# Patient Record
Sex: MALE | URBAN - METROPOLITAN AREA
[De-identification: names, ages, dates, MRNs, and addresses within clinical notes are randomized per-mention and may not be internally consistent; named-entity substitution may affect disease eponyms.]

---

## 2022-10-06 ENCOUNTER — APPOINTMENT (OUTPATIENT)
Dept: RADIOLOGY | Facility: MEDICAL CENTER | Age: 47
DRG: 247 | End: 2022-10-06
Attending: EMERGENCY MEDICINE
Payer: COMMERCIAL

## 2022-10-06 ENCOUNTER — APPOINTMENT (OUTPATIENT)
Dept: CARDIOLOGY | Facility: MEDICAL CENTER | Age: 47
DRG: 247 | End: 2022-10-06
Attending: EMERGENCY MEDICINE
Payer: COMMERCIAL

## 2022-10-06 ENCOUNTER — APPOINTMENT (OUTPATIENT)
Dept: CARDIOLOGY | Facility: MEDICAL CENTER | Age: 47
DRG: 247 | End: 2022-10-06
Attending: STUDENT IN AN ORGANIZED HEALTH CARE EDUCATION/TRAINING PROGRAM
Payer: COMMERCIAL

## 2022-10-06 ENCOUNTER — HOSPITAL ENCOUNTER (INPATIENT)
Facility: MEDICAL CENTER | Age: 47
LOS: 2 days | DRG: 247 | End: 2022-10-08
Attending: STUDENT IN AN ORGANIZED HEALTH CARE EDUCATION/TRAINING PROGRAM | Admitting: STUDENT IN AN ORGANIZED HEALTH CARE EDUCATION/TRAINING PROGRAM
Payer: COMMERCIAL

## 2022-10-06 DIAGNOSIS — I21.3 ST ELEVATION MYOCARDIAL INFARCTION (STEMI), UNSPECIFIED ARTERY (HCC): ICD-10-CM

## 2022-10-06 DIAGNOSIS — I21.11 ST ELEVATION MYOCARDIAL INFARCTION INVOLVING RIGHT CORONARY ARTERY (HCC): ICD-10-CM

## 2022-10-06 DIAGNOSIS — I44.2 THIRD DEGREE HEART BLOCK (HCC): ICD-10-CM

## 2022-10-06 PROBLEM — E87.29 HIGH ANION GAP METABOLIC ACIDOSIS: Status: ACTIVE | Noted: 2022-10-06

## 2022-10-06 PROBLEM — N17.9 ACUTE KIDNEY INJURY (HCC): Status: ACTIVE | Noted: 2022-10-06

## 2022-10-06 PROBLEM — D72.829 LEUKOCYTOSIS: Status: ACTIVE | Noted: 2022-10-06

## 2022-10-06 PROBLEM — R73.9 HYPERGLYCEMIA: Status: ACTIVE | Noted: 2022-10-06

## 2022-10-06 LAB
ACT BLD: 191 SEC (ref 74–137)
ACT BLD: 237 SEC (ref 74–137)
ALBUMIN SERPL BCP-MCNC: 3.8 G/DL (ref 3.2–4.9)
ALBUMIN/GLOB SERPL: 1.2 G/DL
ALP SERPL-CCNC: 77 U/L (ref 30–99)
ALT SERPL-CCNC: 36 U/L (ref 2–50)
ANION GAP SERPL CALC-SCNC: 19 MMOL/L (ref 7–16)
APTT PPP: 24.3 SEC (ref 24.7–36)
AST SERPL-CCNC: 26 U/L (ref 12–45)
BASOPHILS # BLD AUTO: 0.5 % (ref 0–1.8)
BASOPHILS # BLD: 0.07 K/UL (ref 0–0.12)
BILIRUB SERPL-MCNC: 0.3 MG/DL (ref 0.1–1.5)
BUN SERPL-MCNC: 21 MG/DL (ref 8–22)
CALCIUM SERPL-MCNC: 9.4 MG/DL (ref 8.5–10.5)
CHLORIDE SERPL-SCNC: 110 MMOL/L (ref 96–112)
CO2 SERPL-SCNC: 14 MMOL/L (ref 20–33)
CREAT SERPL-MCNC: 1.83 MG/DL (ref 0.5–1.4)
EKG IMPRESSION: NORMAL
EOSINOPHIL # BLD AUTO: 0.04 K/UL (ref 0–0.51)
EOSINOPHIL NFR BLD: 0.3 % (ref 0–6.9)
ERYTHROCYTE [DISTWIDTH] IN BLOOD BY AUTOMATED COUNT: 41.6 FL (ref 35.9–50)
GFR SERPLBLD CREATININE-BSD FMLA CKD-EPI: 45 ML/MIN/1.73 M 2
GLOBULIN SER CALC-MCNC: 3.2 G/DL (ref 1.9–3.5)
GLUCOSE SERPL-MCNC: 138 MG/DL (ref 65–99)
HCT VFR BLD AUTO: 46.4 % (ref 42–52)
HGB BLD-MCNC: 16.1 G/DL (ref 14–18)
IMM GRANULOCYTES # BLD AUTO: 0.1 K/UL (ref 0–0.11)
IMM GRANULOCYTES NFR BLD AUTO: 0.7 % (ref 0–0.9)
INR PPP: 1.13 (ref 0.87–1.13)
LIPASE SERPL-CCNC: 31 U/L (ref 11–82)
LYMPHOCYTES # BLD AUTO: 2.53 K/UL (ref 1–4.8)
LYMPHOCYTES NFR BLD: 16.9 % (ref 22–41)
MCH RBC QN AUTO: 30.3 PG (ref 27–33)
MCHC RBC AUTO-ENTMCNC: 34.7 G/DL (ref 33.7–35.3)
MCV RBC AUTO: 87.2 FL (ref 81.4–97.8)
MONOCYTES # BLD AUTO: 1.18 K/UL (ref 0–0.85)
MONOCYTES NFR BLD AUTO: 7.9 % (ref 0–13.4)
NEUTROPHILS # BLD AUTO: 11.06 K/UL (ref 1.82–7.42)
NEUTROPHILS NFR BLD: 73.7 % (ref 44–72)
NRBC # BLD AUTO: 0 K/UL
NRBC BLD-RTO: 0 /100 WBC
NT-PROBNP SERPL IA-MCNC: 51 PG/ML (ref 0–125)
PLATELET # BLD AUTO: 331 K/UL (ref 164–446)
PMV BLD AUTO: 9.8 FL (ref 9–12.9)
POTASSIUM SERPL-SCNC: 3.5 MMOL/L (ref 3.6–5.5)
PROT SERPL-MCNC: 7 G/DL (ref 6–8.2)
PROTHROMBIN TIME: 14.3 SEC (ref 12–14.6)
RBC # BLD AUTO: 5.32 M/UL (ref 4.7–6.1)
SODIUM SERPL-SCNC: 143 MMOL/L (ref 135–145)
TROPONIN T SERPL-MCNC: 25 NG/L (ref 6–19)
WBC # BLD AUTO: 15 K/UL (ref 4.8–10.8)

## 2022-10-06 PROCEDURE — B2111ZZ FLUOROSCOPY OF MULTIPLE CORONARY ARTERIES USING LOW OSMOLAR CONTRAST: ICD-10-PCS | Performed by: INTERNAL MEDICINE

## 2022-10-06 PROCEDURE — 99152 MOD SED SAME PHYS/QHP 5/>YRS: CPT | Performed by: INTERNAL MEDICINE

## 2022-10-06 PROCEDURE — 700102 HCHG RX REV CODE 250 W/ 637 OVERRIDE(OP): Performed by: INTERNAL MEDICINE

## 2022-10-06 PROCEDURE — 84484 ASSAY OF TROPONIN QUANT: CPT

## 2022-10-06 PROCEDURE — 700111 HCHG RX REV CODE 636 W/ 250 OVERRIDE (IP)

## 2022-10-06 PROCEDURE — 93005 ELECTROCARDIOGRAM TRACING: CPT | Performed by: EMERGENCY MEDICINE

## 2022-10-06 PROCEDURE — 80053 COMPREHEN METABOLIC PANEL: CPT

## 2022-10-06 PROCEDURE — 92941 PRQ TRLML REVSC TOT OCCL AMI: CPT | Mod: RC | Performed by: INTERNAL MEDICINE

## 2022-10-06 PROCEDURE — 36415 COLL VENOUS BLD VENIPUNCTURE: CPT

## 2022-10-06 PROCEDURE — 99285 EMERGENCY DEPT VISIT HI MDM: CPT

## 2022-10-06 PROCEDURE — 700105 HCHG RX REV CODE 258: Performed by: EMERGENCY MEDICINE

## 2022-10-06 PROCEDURE — B240ZZ3 ULTRASONOGRAPHY OF SINGLE CORONARY ARTERY, INTRAVASCULAR: ICD-10-PCS | Performed by: INTERNAL MEDICINE

## 2022-10-06 PROCEDURE — 83880 ASSAY OF NATRIURETIC PEPTIDE: CPT

## 2022-10-06 PROCEDURE — 4A023N7 MEASUREMENT OF CARDIAC SAMPLING AND PRESSURE, LEFT HEART, PERCUTANEOUS APPROACH: ICD-10-PCS | Performed by: INTERNAL MEDICINE

## 2022-10-06 PROCEDURE — 85347 COAGULATION TIME ACTIVATED: CPT | Mod: 91

## 2022-10-06 PROCEDURE — 700101 HCHG RX REV CODE 250

## 2022-10-06 PROCEDURE — 99291 CRITICAL CARE FIRST HOUR: CPT | Mod: GC | Performed by: INTERNAL MEDICINE

## 2022-10-06 PROCEDURE — 85025 COMPLETE CBC W/AUTO DIFF WBC: CPT

## 2022-10-06 PROCEDURE — 770022 HCHG ROOM/CARE - ICU (200)

## 2022-10-06 PROCEDURE — 71045 X-RAY EXAM CHEST 1 VIEW: CPT

## 2022-10-06 PROCEDURE — B2151ZZ FLUOROSCOPY OF LEFT HEART USING LOW OSMOLAR CONTRAST: ICD-10-PCS | Performed by: INTERNAL MEDICINE

## 2022-10-06 PROCEDURE — C1769 GUIDE WIRE: HCPCS

## 2022-10-06 PROCEDURE — 027035Z DILATION OF CORONARY ARTERY, ONE ARTERY WITH TWO DRUG-ELUTING INTRALUMINAL DEVICES, PERCUTANEOUS APPROACH: ICD-10-PCS | Performed by: INTERNAL MEDICINE

## 2022-10-06 PROCEDURE — 99222 1ST HOSP IP/OBS MODERATE 55: CPT | Performed by: STUDENT IN AN ORGANIZED HEALTH CARE EDUCATION/TRAINING PROGRAM

## 2022-10-06 PROCEDURE — 93458 L HRT ARTERY/VENTRICLE ANGIO: CPT | Mod: 26,59 | Performed by: INTERNAL MEDICINE

## 2022-10-06 PROCEDURE — A9270 NON-COVERED ITEM OR SERVICE: HCPCS

## 2022-10-06 PROCEDURE — A9270 NON-COVERED ITEM OR SERVICE: HCPCS | Performed by: INTERNAL MEDICINE

## 2022-10-06 PROCEDURE — 83690 ASSAY OF LIPASE: CPT

## 2022-10-06 PROCEDURE — 700102 HCHG RX REV CODE 250 W/ 637 OVERRIDE(OP)

## 2022-10-06 PROCEDURE — 85730 THROMBOPLASTIN TIME PARTIAL: CPT

## 2022-10-06 PROCEDURE — 02C03ZZ EXTIRPATION OF MATTER FROM CORONARY ARTERY, ONE ARTERY, PERCUTANEOUS APPROACH: ICD-10-PCS | Performed by: INTERNAL MEDICINE

## 2022-10-06 PROCEDURE — 700117 HCHG RX CONTRAST REV CODE 255: Performed by: INTERNAL MEDICINE

## 2022-10-06 PROCEDURE — 92978 ENDOLUMINL IVUS OCT C 1ST: CPT | Mod: 26,RC | Performed by: INTERNAL MEDICINE

## 2022-10-06 PROCEDURE — 85610 PROTHROMBIN TIME: CPT

## 2022-10-06 PROCEDURE — 93005 ELECTROCARDIOGRAM TRACING: CPT | Performed by: INTERNAL MEDICINE

## 2022-10-06 RX ORDER — ONDANSETRON 2 MG/ML
4 INJECTION INTRAMUSCULAR; INTRAVENOUS EVERY 4 HOURS PRN
Status: DISCONTINUED | OUTPATIENT
Start: 2022-10-06 | End: 2022-10-08 | Stop reason: HOSPADM

## 2022-10-06 RX ORDER — MIDAZOLAM HYDROCHLORIDE 1 MG/ML
INJECTION INTRAMUSCULAR; INTRAVENOUS
Status: COMPLETED
Start: 2022-10-06 | End: 2022-10-06

## 2022-10-06 RX ORDER — EPTIFIBATIDE 2 MG/ML
INJECTION, SOLUTION INTRAVENOUS
Status: COMPLETED
Start: 2022-10-06 | End: 2022-10-06

## 2022-10-06 RX ORDER — HEPARIN SODIUM 1000 [USP'U]/ML
INJECTION, SOLUTION INTRAVENOUS; SUBCUTANEOUS
Status: COMPLETED
Start: 2022-10-06 | End: 2022-10-06

## 2022-10-06 RX ORDER — PHENYLEPHRINE HCL IN 0.9% NACL 0.5 MG/5ML
SYRINGE (ML) INTRAVENOUS
Status: COMPLETED
Start: 2022-10-06 | End: 2022-10-06

## 2022-10-06 RX ORDER — LISINOPRIL 5 MG/1
5 TABLET ORAL
Status: DISCONTINUED | OUTPATIENT
Start: 2022-10-07 | End: 2022-10-08 | Stop reason: HOSPADM

## 2022-10-06 RX ORDER — PRASUGREL 10 MG/1
10 TABLET, FILM COATED ORAL DAILY
Status: DISCONTINUED | OUTPATIENT
Start: 2022-10-07 | End: 2022-10-08 | Stop reason: HOSPADM

## 2022-10-06 RX ORDER — BISACODYL 10 MG
10 SUPPOSITORY, RECTAL RECTAL
Status: DISCONTINUED | OUTPATIENT
Start: 2022-10-06 | End: 2022-10-08 | Stop reason: HOSPADM

## 2022-10-06 RX ORDER — SODIUM CHLORIDE 9 MG/ML
INJECTION, SOLUTION INTRAVENOUS CONTINUOUS
Status: DISCONTINUED | OUTPATIENT
Start: 2022-10-06 | End: 2022-10-07

## 2022-10-06 RX ORDER — ONDANSETRON 4 MG/1
4 TABLET, ORALLY DISINTEGRATING ORAL EVERY 4 HOURS PRN
Status: DISCONTINUED | OUTPATIENT
Start: 2022-10-06 | End: 2022-10-08 | Stop reason: HOSPADM

## 2022-10-06 RX ORDER — PROMETHAZINE HYDROCHLORIDE 25 MG/1
12.5-25 TABLET ORAL EVERY 4 HOURS PRN
Status: DISCONTINUED | OUTPATIENT
Start: 2022-10-06 | End: 2022-10-08 | Stop reason: HOSPADM

## 2022-10-06 RX ORDER — NOREPINEPHRINE BITARTRATE 1 MG/ML
INJECTION, SOLUTION INTRAVENOUS
Status: COMPLETED
Start: 2022-10-06 | End: 2022-10-06

## 2022-10-06 RX ORDER — PROMETHAZINE HYDROCHLORIDE 12.5 MG/1
12.5-25 SUPPOSITORY RECTAL EVERY 4 HOURS PRN
Status: DISCONTINUED | OUTPATIENT
Start: 2022-10-06 | End: 2022-10-08 | Stop reason: HOSPADM

## 2022-10-06 RX ORDER — VERAPAMIL HYDROCHLORIDE 2.5 MG/ML
INJECTION, SOLUTION INTRAVENOUS
Status: COMPLETED
Start: 2022-10-06 | End: 2022-10-06

## 2022-10-06 RX ORDER — ATORVASTATIN CALCIUM 40 MG/1
40 TABLET, FILM COATED ORAL EVERY EVENING
Status: DISCONTINUED | OUTPATIENT
Start: 2022-10-06 | End: 2022-10-08 | Stop reason: HOSPADM

## 2022-10-06 RX ORDER — AMOXICILLIN 250 MG
2 CAPSULE ORAL 2 TIMES DAILY
Status: DISCONTINUED | OUTPATIENT
Start: 2022-10-06 | End: 2022-10-08 | Stop reason: HOSPADM

## 2022-10-06 RX ORDER — ASPIRIN 81 MG/1
81 TABLET, CHEWABLE ORAL DAILY
Status: DISCONTINUED | OUTPATIENT
Start: 2022-10-07 | End: 2022-10-08 | Stop reason: HOSPADM

## 2022-10-06 RX ORDER — PROCHLORPERAZINE EDISYLATE 5 MG/ML
5-10 INJECTION INTRAMUSCULAR; INTRAVENOUS EVERY 4 HOURS PRN
Status: DISCONTINUED | OUTPATIENT
Start: 2022-10-06 | End: 2022-10-08 | Stop reason: HOSPADM

## 2022-10-06 RX ORDER — POLYETHYLENE GLYCOL 3350 17 G/17G
1 POWDER, FOR SOLUTION ORAL
Status: DISCONTINUED | OUTPATIENT
Start: 2022-10-06 | End: 2022-10-08 | Stop reason: HOSPADM

## 2022-10-06 RX ORDER — HEPARIN SODIUM 200 [USP'U]/100ML
INJECTION, SOLUTION INTRAVENOUS
Status: COMPLETED
Start: 2022-10-06 | End: 2022-10-06

## 2022-10-06 RX ORDER — PRASUGREL 10 MG/1
60 TABLET, FILM COATED ORAL ONCE
Status: DISCONTINUED | OUTPATIENT
Start: 2022-10-06 | End: 2022-10-06

## 2022-10-06 RX ORDER — PRASUGREL 10 MG/1
TABLET, FILM COATED ORAL
Status: COMPLETED
Start: 2022-10-06 | End: 2022-10-06

## 2022-10-06 RX ORDER — ACETAMINOPHEN 325 MG/1
650 TABLET ORAL EVERY 6 HOURS PRN
Status: DISCONTINUED | OUTPATIENT
Start: 2022-10-06 | End: 2022-10-08 | Stop reason: HOSPADM

## 2022-10-06 RX ORDER — LIDOCAINE HYDROCHLORIDE 20 MG/ML
INJECTION, SOLUTION INFILTRATION; PERINEURAL
Status: COMPLETED
Start: 2022-10-06 | End: 2022-10-06

## 2022-10-06 RX ADMIN — MIDAZOLAM HYDROCHLORIDE 2 MG: 1 INJECTION, SOLUTION INTRAMUSCULAR; INTRAVENOUS at 15:04

## 2022-10-06 RX ADMIN — FENTANYL CITRATE 100 MCG: 50 INJECTION, SOLUTION INTRAMUSCULAR; INTRAVENOUS at 15:08

## 2022-10-06 RX ADMIN — Medication 1000 MCG: at 15:14

## 2022-10-06 RX ADMIN — ATORVASTATIN CALCIUM 40 MG: 40 TABLET, FILM COATED ORAL at 17:38

## 2022-10-06 RX ADMIN — NOREPINEPHRINE BITARTRATE 8 MG: 1 INJECTION, SOLUTION, CONCENTRATE INTRAVENOUS at 15:00

## 2022-10-06 RX ADMIN — FENTANYL CITRATE 25 MCG: 50 INJECTION, SOLUTION INTRAMUSCULAR; INTRAVENOUS at 14:25

## 2022-10-06 RX ADMIN — EPTIFIBATIDE 20000 MCG: 2 INJECTION, SOLUTION INTRAVENOUS at 14:47

## 2022-10-06 RX ADMIN — NITROGLYCERIN 10 ML: 20 INJECTION INTRAVENOUS at 14:46

## 2022-10-06 RX ADMIN — EPTIFIBATIDE 20000 MCG: 2 INJECTION INTRAVENOUS at 15:01

## 2022-10-06 RX ADMIN — PRASUGREL 60 MG: 10 TABLET, FILM COATED ORAL at 15:09

## 2022-10-06 RX ADMIN — HEPARIN SODIUM: 1000 INJECTION, SOLUTION INTRAVENOUS; SUBCUTANEOUS at 14:45

## 2022-10-06 RX ADMIN — VERAPAMIL HYDROCHLORIDE 5 MG: 2.5 INJECTION, SOLUTION INTRAVENOUS at 14:45

## 2022-10-06 RX ADMIN — LIDOCAINE HYDROCHLORIDE: 20 INJECTION, SOLUTION INFILTRATION; PERINEURAL at 14:45

## 2022-10-06 RX ADMIN — FENTANYL CITRATE 50 MCG: 50 INJECTION, SOLUTION INTRAMUSCULAR; INTRAVENOUS at 15:12

## 2022-10-06 RX ADMIN — HEPARIN SODIUM 2000 UNITS: 200 INJECTION, SOLUTION INTRAVENOUS at 14:46

## 2022-10-06 RX ADMIN — HEPARIN SODIUM: 1000 INJECTION, SOLUTION INTRAVENOUS; SUBCUTANEOUS at 14:51

## 2022-10-06 RX ADMIN — IOHEXOL 102 ML: 350 INJECTION, SOLUTION INTRAVENOUS at 15:17

## 2022-10-06 RX ADMIN — SODIUM CHLORIDE: 9 INJECTION, SOLUTION INTRAVENOUS at 16:01

## 2022-10-06 ASSESSMENT — ENCOUNTER SYMPTOMS
WEAKNESS: 0
FOCAL WEAKNESS: 0
DIARRHEA: 0
COUGH: 0
VOMITING: 0
NAUSEA: 0
SHORTNESS OF BREATH: 0
PALPITATIONS: 0
FEVER: 0
BLURRED VISION: 0
CHILLS: 0
ABDOMINAL PAIN: 0
DOUBLE VISION: 0

## 2022-10-06 ASSESSMENT — HEART SCORE
HISTORY: HIGHLY SUSPICIOUS
HEART SCORE: 7
RISK FACTORS: 1-2 RISK FACTORS
TROPONIN: 1-3 TIMES NORMAL LIMIT
ECG: SIGNIFICANT ST-DEPRESSION
AGE: 45-64

## 2022-10-06 ASSESSMENT — PAIN DESCRIPTION - PAIN TYPE
TYPE: ACUTE PAIN

## 2022-10-06 ASSESSMENT — FIBROSIS 4 INDEX: FIB4 SCORE: 0.6

## 2022-10-06 NOTE — PROGRESS NOTES
2 RN Skin Assessment Completed by Nadeem RN and Tiana RN.    Head: WDL  Ears: WDL  Nose: WDL  Mouth: WDL  Neck: WDL  Breasts/Chest: WDL  Shoulder Blades: WDL  Spine: WDL  (R) Arm/Elbow/hand: WDL  (L) Arm/Elbow/hand: WDL  Abdomen:WDL  Groin: WDL  Sacrum/Coccyx/Buttocks: WDL  (R) Leg: WDL  (L) Leg: WDL  (R) Heel/Foot/Toe: WDL  (L) Heel/Foot/Toe: WDL          Devices in place: ECG, Tele Box, BP Cuff, and Pulse Ox    Interventions in place: Q2 turns and Low air loss mattress     Possible skin injury found: No    Pictures uploaded into Epic: N/A  Wound Consult Placed: N/A

## 2022-10-06 NOTE — CONSULTS
Reason for Consult:  Asked by Dr Casarez to see this patient with inferior STEMI   Patient's PCP: No primary care provider on file.    CC:] STEMI      HPI:  Klaus Rodriguez is a 47 year old man with no prior medical history (nonsmoker/non drug user) who presented via EMS for STEMI.  The patient started having chest pain about 45 mins prior to arrival after doing some physical work.     He reports 10/10 chest pain.  On arrival, the patient was bradycardic and hypotensive with HR 40s and BP 80s/40s.  He reports associated shortness of breath. No orthopnea/PND/leg swelling. No palpitations. No syncope or presyncopal episodes.     Medications / Drug list prior to admission:  No current facility-administered medications on file prior to encounter.     No current outpatient medications on file prior to encounter.       Current list of administered Medications:    Current Facility-Administered Medications:     VERAPAMIL HCL 2.5 MG/ML IV SOLN, , , ,     HEPARIN SODIUM (PORCINE) 1000 UNIT/ML INJ SOLN, , , ,     LIDOCAINE HCL 2 % INJ SOLN, , , ,     HEPARIN (PORCINE) IN NACL 2000-0.9 UNIT/L-% IV SOLN, , , ,     NITROGLYCERIN 2 MG IV SOLN, , , ,     FENTANYL CITRATE (PF) 0.05 MG/ML INJ SOLN (WRAPPED), , , ,     MIDAZOLAM HCL 2 MG/2ML INJ SOLN (WRAPPED), , , ,     ATROPINE SULFATE 0.1 MG/ML INJ SOLN, , , ,     NS infusion, , Intravenous, Continuous, Trinity Casarez M.D.    PHENYLEPHRINE HCL-NACL 1-0.9 MG/10ML-% IV SOSY, , , ,     HEPARIN SODIUM (PORCINE) 1000 UNIT/ML INJ SOLN, , , ,   No current outpatient medications on file.    No past medical history on file.    No past surgical history on file.    No family history on file.  Patient family history was personally reviewed, no pertinent family history to current presentation         ALLERGIES:  Not on File    Review of systems:  A complete review of symptoms was reviewed with patient. This is reviewed in H&P and PMH. ALL OTHERS reviewed and negative    Physical exam:  BP 89/45 HR  45  Wt 113 kg (250 lb)    General: In mild distress  EYES: no jaundice  HEENT: OP clear   Neck: No bruits No JVD.   CVS:  Bradycardic rate, regular rhythm. S1 + S2. No M/R/G  Resp: CTAB. No wheezing or crackles/rhonchi.  Abdomen: Soft, NT, ND,  Skin: Grossly nothing acute no obvious rashes  Neurological: Alert, Moves all extremities, no cranial nerve defects on limited exam  Extremities: Pulse 2+ in b/l LE.  No edema. No cyanosis.       Data:  Laboratory studies personally reviewed by me:  Recent Results (from the past 24 hour(s))   EKG    Collection Time: 10/06/22  2:17 PM   Result Value Ref Range    Report       [facility]    Test Date:  2022-10-06  Pt Name:    ТАТЬЯНА SHEPHERD               Department: ER  MRN:        [id number]                      Room:       TRAUMA - EXAM 1  Gender:     Male                         Technician: [id number]  :        1975                   Requested By:MAHNAZ PATINO  Order #:    [id number]                    Reading MD:    Measurements  Intervals                                Axis  Rate:       47                           P:          0  WY:                                      QRS:        77  QRSD:       105                          T:          95  QT:         516  QTc:        457    Interpretive Statements  AV block, complete (third degree)  Inferior infarct, acute (RCA)  Lateral leads are also involved  Probable RV involvement, suggest recording right precordial leads  No previous ECG available for comparison         Imaging:  CL-LEFT HEART CATHETERIZATION WITH POSSIBLE INTERVENTION    (Results Pending)   DX-CHEST-PORTABLE (1 VIEW)    (Results Pending)           EKG 10/6/2022: personally reviewed by me inferior STEMI, third degree AV block    All pertinent features of laboratory and imaging reviewed including primary images where applicable      Active Problems:    STEMI (ST elevation myocardial infarction) (HCC) POA: Unknown    CHB (complete  heart block) (HCC) POA: Unknown  Resolved Problems:    * No resolved hospital problems. *      Assessment / Plan:  Inferior STEMI  CHB  -Informed cath lab, plan for urgent cardiac catheterization with Dr Live   -Aspirin 325mg once, followed by Aspirin 81mg daily, P2Y12 to be started in cath lab  -Start atorvastatin 40mg daily  -Hold beta blocker due to high degree AV block  -Start start lisinopril 5mg PO daily tomorrow  -Obtain echocardiogram to assess LVEF and any structural abnormalities.     I personally discussed his case with  Dr Danna rivera. providers found    It is my pleasure to participate in the care of Mr. Rodriguez.  Please do not hesitate to contact me with questions or concerns.    Josafat Hills MD   Cardiologist [facility]    10/6/2022    Please note that this dictation was created using voice recognition software. There may be errors I did not discover before finalizing the note.

## 2022-10-06 NOTE — PROGRESS NOTES
TOA 1419     PTA Medications: 325 ASA, 150 mcg fentanyl, Nitro x 1    Bradycardic, Soft BP, 1L bolus started upon arrival.      STEMI confirmed by Dr. Hills and Dr. Live upon arrival.  Patient transferred straight from trauma bay to Cath Lab 4 [facility].

## 2022-10-06 NOTE — H&P
Critical Care/Pulmonary Consultation    Date of Service: 10/6/2022    Date of Admission:  10/6/2022  2:22 PM    Consulting Physician: Wisam Saleem D.O.    Chief Complaint:  No chief complaint on file.      History of Present Illness: Klaus Rodriguez is a 47 y.o. male with no significant past medical history.  He was taking a scaling class near [place], was about 20 feet up an incline when he started to become dizzy, disoriented and started having crushing, substernal chest pain.  He was lowered down and his team called 911, who told them to administer 325 of aspirin at that time.  EMS arrived and administered nitroglycerin with continued pain.  He was transported by air ambulance to [facility].  Upon presentation he was found to have inferior STEMI and was taken to cath lab immediately.  He had Severe occlusion of the proximal right coronary artery w/ 2 overlapping RUIZ placed in the proximal to mid right coronary artery.      After cath he is feeling great, reports no chest pain, no shortness of breath, no dizziness.  He is awake, alert and oriented x4.  Vitals stable in cath.  Labs on admission remarkable for WBC 15, CO2 14, anion gap 19, creatinine 1.83, troponin initially 25.  CXR unremarkable.  Repeat EKG following cath showing improvement of ST elevations.  Pt admitted to ICU for post cardiac catheterization care.      Review of Systems   Constitutional:  Negative for chills and fever.   Eyes:  Negative for blurred vision and double vision.   Respiratory:  Negative for cough and shortness of breath.    Cardiovascular:  Negative for chest pain and palpitations.   Gastrointestinal:  Negative for abdominal pain, diarrhea, nausea and vomiting.   Genitourinary:  Negative for dysuria and urgency.   Neurological:  Negative for focal weakness and weakness.     Home Medications    **Home medications have not yet been reviewed for this encounter**     Pt takes no medications.            No past medical history on file.    No  "past surgical history on file.    Allergies: Patient has no allergy information on record.    Family History:  Triple bypass in his father.      Social hx:  Tobacco:  approx 30 years smoking, quit 2 years ago but has transitioned to vaping without nicotine.  Alcohol:  social, approx 6 beers/month.  Denies recreational drugs.     Vitals:    10/06/22 1431 10/06/22 1533   Height:  1.778 m (5' 10\")   Weight: 113 kg (250 lb) 111 kg (244 lb 11.4 oz)   Weight % change since last entry.: 0 % -2.12 %   BP:  115/68   BMI (Calculated):  35.11       Physical Examination    Physical Exam  Vitals and nursing note reviewed.   Constitutional:       General: He is not in acute distress.     Appearance: He is not toxic-appearing.   HENT:      Head: Normocephalic and atraumatic.      Mouth/Throat:      Mouth: Mucous membranes are moist.      Pharynx: Oropharynx is clear.   Eyes:      Conjunctiva/sclera: Conjunctivae normal.   Cardiovascular:      Rate and Rhythm: Normal rate and regular rhythm.      Pulses: Normal pulses.      Heart sounds: Normal heart sounds. No murmur heard.    No friction rub. No gallop.      Comments: Difficult to auscultate due to body habitus.    Pulmonary:      Effort: Pulmonary effort is normal. No respiratory distress.      Breath sounds: Normal breath sounds. No wheezing, rhonchi or rales.   Abdominal:      General: Abdomen is flat. There is no distension.      Palpations: Abdomen is soft.      Tenderness: There is no abdominal tenderness.   Musculoskeletal:      Right lower leg: No edema.      Left lower leg: No edema.   Skin:     General: Skin is warm and dry.   Neurological:      General: No focal deficit present.      Mental Status: He is alert.      Comments: Moving all 4 extremities spontaneously.          No intake or output data in the 24 hours ending 10/06/22 1634    Recent Labs     10/06/22  1425   WBC 15.0*   NEUTSPOLYS 73.70*   LYMPHOCYTES 16.90*   MONOCYTES 7.90   EOSINOPHILS 0.30   BASOPHILS " 0.50   ASTSGOT 26   ALTSGPT 36   ALKPHOSPHAT 77   TBILIRUBIN 0.3     Recent Labs     10/06/22  1425   SODIUM 143   POTASSIUM 3.5*   CHLORIDE 110   CO2 14*   BUN 21   CREATININE 1.83*   CALCIUM 9.4     Recent Labs     10/06/22  1425   ALTSGPT 36   ASTSGOT 26   ALKPHOSPHAT 77   TBILIRUBIN 0.3   LIPASE 31   GLUCOSE 138*         CL-LEFT HEART CATHETERIZATION WITH POSSIBLE INTERVENTION    (Results Pending)   DX-CHEST-PORTABLE (1 VIEW)    (Results Pending)       Patient Active Problem List   Diagnosis    STEMI (ST elevation myocardial infarction) (HCC)    CHB (complete heart block) (HCC)    STEMI involving right coronary artery (HCC)       Assessment and Plan:    * STEMI involving right coronary artery (HCC)  Assessment & Plan  Inferior STEMI present on admission, taken to cath lab on 10/6 found to have RCA occlusion, now s/p 2 overlapping RUIZ placed to proximal mid to right RCA.  Pt reports no previous episodes of chest pain prior to this.    -cardiology following, appreciate assistance  -optimize medical management with atorvastatin, hold beta blocker in setting of complete heart block on admission, lisinopril when able, dual antiplatelet therapy  -continue monitoring for chest pain  -echocardiogram  -check a1c, lipid panel    Acute kidney injury (HCC)  Assessment & Plan  Likely pre-renal in the setting of MI.  -continue to monitor  -avoid nephrotoxins  -renally dose meds as appropriate    High anion gap metabolic acidosis  Assessment & Plan  Present on admission.  Likely due to lactic acidosis in the setting of MI.    -continue to monitor, if continues consider further investigation    CHB (complete heart block) (HCC)  Assessment & Plan  Complete heart block present on admission.  Likely due to STEMI.  Repeat EKG looks improved, although pending formal read.    -pending formal EKG read    Hyperglycemia  Assessment & Plan  Present on admission.  Possibly due to acute stress of MI vs chronic.  -a1c  pending    Leukocytosis  Assessment & Plan  Likely reactive in the setting of MI.  No obvious s/sx of infection.  -continue to monitor       Quality measures:  Feeding:  cardiac  Analgesia: tylenol  Sedation: n/a  Thromboprophylaxis: n/a  Head of bed: >30 degrees  Ulcer prophylaxis: n/a  Glycemic control: n/a  Bowel care: bowel regimen  Indwelling lines: pIV  Deescalation of antibiotics: n/a      CODE STATUS:   Full code  DISPO:    ICU admission for post STEMI      William Shaw D.O.  Internal Medicine Resident  [facility]  Patient is critically ill.   The patient continues to have: post cardiac cath  The vital organ system that is affected is the: cardiac  If untreated there is a high chance of deterioration into: treated at this time.  And eventually death.   The critical care that I am providing today is: post STEMI care  The critical that has been undertaken is medically complex.   There has been no overlap in critical care time.   Critical Care Time not including procedures: 45 minutes

## 2022-10-06 NOTE — DISCHARGE PLANNING
MSW responded to Code Stemi. Pt was BIB [facility] from [facility] job site in [place]. Pt requested MSW call his wife Priyanka Rodriguez ([phone]). MSW update Priyanka. She is on her way to [facility] from [place].

## 2022-10-06 NOTE — ED PROVIDER NOTES
"ED Provider Note    Scribed for Trinity Casarez M.D. by Isra Dee. 10/6/2022  2:19 PM    Primary care provider: No primary care provider noted.  Means of arrival: EMS  History obtained from: EMS  History limited by: None  CHIEF COMPLAINT  STEMI alert from the field      HPI  Klaus Rodriguez is a 47 y.o. male who presents to the Emergency Department via EMS as a code STEMI onset at approximately 1:35 PM today. 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 He was moved from his work site to [facility] where EMS transported him to the ED and arrived at 2:19 PM with his episode of chest pain lasting approximately 45 minutes. The patient reports that his chest pain is radiating into his back, but does not radiate into her arms or jaw. Associated symptoms include diaphoresis, lightheadedness, shortness of breath, nausea, and back pain. The patient denies any vomiting, abdominal pain, generalized numbness, generalized tingling. EMS medicated the patient with Aspirin 325 mg, Fentanyl 150 mcg, and Nitroglycerin with minimal alleviation. No exacerbating factors noted. He denies any pertinent medical history including but not limited to diabetes, hypertension,  or MI. The patient does not take any daily medications. He denies any alcohol consumption, tobacco use, or illicit drug use including marijuana, methamphetamines, or cocaine. The patient admits to occasionally vaping. There is a paternal history of MI in his 70s. He is unaware of any allergies.      REVIEW OF SYSTEMS  Pertinent positives include code STEMI, diaphoresis, lightheadedness, shortness of breath, nausea, and back pain.   Pertinent negatives include no vomiting, abdominal pain, generalized numbness, generalized tingling.   See HPI for further details. All other systems are negative.    PAST MEDICAL HISTORY  No past medical history " "noted.    FAMILY HISTORY  No family history noted.    SOCIAL HISTORY      Social History     Substance and Sexual Activity   Drug Use None noted       SURGICAL HISTORY  No past surgical history noted.    CURRENT MEDICATIONS  Home Medications    **Home medications have not yet been reviewed for this encounter**         ALLERGIES  None noted    PHYSICAL EXAM  VITAL SIGNS: /68   Ht 1.778 m (5' 10\")   Wt 111 kg (244 lb 11.4 oz)   BMI 35.11 kg/m²      Constitutional: Well developed, well nourished; Moderate to severe distress; Ill-appearing  HENT: Normocephalic, atraumatic; Bilateral external ears normal; oropharyngeal examination deferred due to COVID-19 outbreak and lack of oropharyngeal complaint  Eyes: PERRL, EOMI, Conjunctiva normal. No discharge.   Neck:  Mild JVD. Supple, nontender midline; No stridor; No nuchal rigidity.   Lymphatic: No cervical lymphadenopathy noted.   Cardiovascular: Regular rate and rhythm without murmurs, rubs, or gallop.   Thorax & Lungs: No respiratory distress, breath sounds clear to auscultation bilaterally without wheezing, rales or rhonchi. Nontender chest wall. No crepitus or subcutaneous air  Abdomen: Soft, nontender, bowel sounds normal. No obvious masses; No pulsatile masses; no rebound, guarding, or peritoneal signs.   Skin: Good color; warm and dry without rash or petechia.  Back: Nontender, No CVA tenderness.   Extremities: Distal radial, dorsalis pedis, posterior tibial pulses are equal bilaterally; No edema; Nontender calves or saphenous, No cyanosis, No clubbing.   Musculoskeletal: Good range of motion in all major joints. No tenderness to palpation or major deformities noted.   Neurologic: Alert & oriented x 4, clear speech.    EKG  12 Lead EKG interpreted by me, as noted below.     LABS/RADIOLOGY/PROCEDURES  Results for orders placed or performed during the hospital encounter of 10/06/22   TROPONIN   Result Value Ref Range    Troponin T 25 (H) 6 - 19 ng/L   proBrain " Natriuretic Peptide, NT   Result Value Ref Range    NT-proBNP 51 0 - 125 pg/mL   CBC WITH DIFFERENTIAL   Result Value Ref Range    WBC 15.0 (H) 4.8 - 10.8 K/uL    RBC 5.32 4.70 - 6.10 M/uL    Hemoglobin 16.1 14.0 - 18.0 g/dL    Hematocrit 46.4 42.0 - 52.0 %    MCV 87.2 81.4 - 97.8 fL    MCH 30.3 27.0 - 33.0 pg    MCHC 34.7 33.7 - 35.3 g/dL    RDW 41.6 35.9 - 50.0 fL    Platelet Count 331 164 - 446 K/uL    MPV 9.8 9.0 - 12.9 fL    Neutrophils-Polys 73.70 (H) 44.00 - 72.00 %    Lymphocytes 16.90 (L) 22.00 - 41.00 %    Monocytes 7.90 0.00 - 13.40 %    Eosinophils 0.30 0.00 - 6.90 %    Basophils 0.50 0.00 - 1.80 %    Immature Granulocytes 0.70 0.00 - 0.90 %    Nucleated RBC 0.00 /100 WBC    Neutrophils (Absolute) 11.06 (H) 1.82 - 7.42 K/uL    Lymphs (Absolute) 2.53 1.00 - 4.80 K/uL    Monos (Absolute) 1.18 (H) 0.00 - 0.85 K/uL    Eos (Absolute) 0.04 0.00 - 0.51 K/uL    Baso (Absolute) 0.07 0.00 - 0.12 K/uL    Immature Granulocytes (abs) 0.10 0.00 - 0.11 K/uL    NRBC (Absolute) 0.00 K/uL   COMP METABOLIC PANEL   Result Value Ref Range    Sodium 143 135 - 145 mmol/L    Potassium 3.5 (L) 3.6 - 5.5 mmol/L    Chloride 110 96 - 112 mmol/L    Co2 14 (L) 20 - 33 mmol/L    Anion Gap 19.0 (H) 7.0 - 16.0    Glucose 138 (H) 65 - 99 mg/dL    Bun 21 8 - 22 mg/dL    Creatinine 1.83 (H) 0.50 - 1.40 mg/dL    Calcium 9.4 8.5 - 10.5 mg/dL    AST(SGOT) 26 12 - 45 U/L    ALT(SGPT) 36 2 - 50 U/L    Alkaline Phosphatase 77 30 - 99 U/L    Total Bilirubin 0.3 0.1 - 1.5 mg/dL    Albumin 3.8 3.2 - 4.9 g/dL    Total Protein 7.0 6.0 - 8.2 g/dL    Globulin 3.2 1.9 - 3.5 g/dL    A-G Ratio 1.2 g/dL   LIPASE   Result Value Ref Range    Lipase 31 11 - 82 U/L   PROTHROMBIN TIME   Result Value Ref Range    PT 14.3 12.0 - 14.6 sec    INR 1.13 0.87 - 1.13   APTT   Result Value Ref Range    APTT 24.3 (L) 24.7 - 36.0 sec   ESTIMATED GFR   Result Value Ref Range    GFR (CKD-EPI) 45 (A) >60 mL/min/1.73 m 2   EKG   Result Value Ref Range    Report       [facility] Emergency Dept.    Test Date:  2022-10-06  Pt Name:    ТАТЬЯНА SHEPHERD               Department: ER  MRN:        [id number]                      Room:       [facility]  Gender:     Male                         Technician: [id number]  :        1975                   Requested By:TRINITY CASAREZ  Order #:    [id number]                    Reading MD: Trinity Casarez    Measurements  Intervals                                Axis  Rate:       47                           P:          0  OH:                                      QRS:        77  QRSD:       105                          T:          95  QT:         516  QTc:        457    Interpretive Statements  AV block, complete (third degree)  Inferior infarct, acute (RCA)  STEMI  Lateral leads are also involved  Probable RV involvement, suggest recording right precordial leads  No previous ECG available for comparison  Electronically Signed On 10-6-2022 15:34:50 PDT by Trinity Casarez     POCT activated clotting time device results   Result Value Ref Range    Istat Activated Clotting Time 191 (H) 74 - 137 sec   POCT activated clotting time device results   Result Value Ref Range    Istat Activated Clotting Time 237 (H) 74 - 137 sec       CL-LEFT HEART CATHETERIZATION WITH POSSIBLE INTERVENTION    (Results Pending)   DX-CHEST-PORTABLE (1 VIEW)    (Results Pending)       COURSE & MEDICAL DECISION MAKING  Pertinent Labs & Imaging studies reviewed. (See chart for details)    2:19 PM - Patient seen and examined in the trauma bay. Discussed plan of care, including labs, imaging, and immediate cardiac catheterization. Patient agrees to the plan of care. The patient will be resuscitated with 1L NS IV and medicated with Fentanyl 25 mcg. Ordered for labs and imaging to evaluate his symptoms. Dr. Hills was present at bedside during initial evaluation and will be taken to the catheterization lab for imminent intervention by Dr. Live.    Patient presents to the ER as a code  STEMI from the field.  He was up near the [facility], which is something he does for work on a regular basis, when he started having crushing substernal chest pain radiating to his back.  He describes lightheadedness, diaphoresis, nausea and shortness of breath.  No vomiting.  He has no medical history.  No known heart problems.  No diabetes, hypertension, hyperlipidemia or tobacco use.  He vapes on occasion.  No drug use.  Father had a heart attack in his 70s, but otherwise no significant family history.  Patient is having a STEMI with third-degree heart block.  He arrived into the trauma bay.  Dr. Hills, cardiologist, and I met the patient upon arrival.  She contacted Dr. Live, interventional list, and patient will go to Cath Lab emergently.  Blood pressure was in the mid to high 90s systolic.  Heart rate was in the high 40s to low 50s with a third-degree heart block.  Pacer pads were placed.  Patient was given IV fluids.  He received aspirin in the field.  He also received fentanyl in the field was still complaining of a 10 out of 10 chest pain.  Labs were drawn.  He was taken emergently to cardiac catheterization lab.  Based upon their findings the cardiologist will contact intensivist/hospitalist for hospitalization.    HEART SCORE: 7    CRITICAL CARE  The very real possibilty of a deterioration of this patient's condition required the highest level of my preparedness for sudden, emergent intervention.  I provided critical care services, which included medication orders, frequent reevaluations of the patient's condition and response to treatment, ordering and reviewing test results, and discussing the case with various consultants.  The critical care time associated with the care of the patient was 35 minutes. Review chart for interventions. This time is exclusive of any other billable procedures.     DISPOSITION:  Patient will be taken to cath lab under the care of Dr. Hills in critical  condition.    FINAL IMPRESSION  1. ST elevation myocardial infarction involving right coronary artery (HCC)    2. ST elevation myocardial infarction (STEMI), unspecified artery (HCC) Acute   3. Third degree heart block (HCC) Acute    Critical care time performed by me, as detailed above     Isra FERNANDEZ (Scribe), am scribing for, and in the presence of, Trinity Casarez M.D..    Electronically signed by: Isra Dee (Scribe), 10/6/2022    Trinity FERNANDEZ M.D. personally performed the services described in this documentation, as scribed by Isra Dee in my presence, and it is both accurate and complete.    This dictation has been created using voice recognition software. The accuracy of the dictation is limited by the abilities of the software. I expect there may be some errors of grammar and possibly content. I made every attempt to manually correct the errors within my dictation. However, errors related to voice recognition software may still exist and should be interpreted within the appropriate context.    The note accurately reflects work and decisions made by me.  Trinity Casarez M.D.  10/6/2022  3:38 PM

## 2022-10-07 ENCOUNTER — APPOINTMENT (OUTPATIENT)
Dept: CARDIOLOGY | Facility: MEDICAL CENTER | Age: 47
DRG: 247 | End: 2022-10-07
Attending: NURSE PRACTITIONER
Payer: COMMERCIAL

## 2022-10-07 LAB
ANION GAP SERPL CALC-SCNC: 11 MMOL/L (ref 7–16)
BASOPHILS # BLD AUTO: 0.2 % (ref 0–1.8)
BASOPHILS # BLD: 0.02 K/UL (ref 0–0.12)
BUN SERPL-MCNC: 14 MG/DL (ref 8–22)
CALCIUM SERPL-MCNC: 8.5 MG/DL (ref 8.5–10.5)
CHLORIDE SERPL-SCNC: 113 MMOL/L (ref 96–112)
CHOLEST SERPL-MCNC: 138 MG/DL (ref 100–199)
CO2 SERPL-SCNC: 18 MMOL/L (ref 20–33)
CREAT SERPL-MCNC: 0.97 MG/DL (ref 0.5–1.4)
EKG IMPRESSION: NORMAL
EOSINOPHIL # BLD AUTO: 0.03 K/UL (ref 0–0.51)
EOSINOPHIL NFR BLD: 0.3 % (ref 0–6.9)
ERYTHROCYTE [DISTWIDTH] IN BLOOD BY AUTOMATED COUNT: 45.1 FL (ref 35.9–50)
EST. AVERAGE GLUCOSE BLD GHB EST-MCNC: 108 MG/DL
GFR SERPLBLD CREATININE-BSD FMLA CKD-EPI: 97 ML/MIN/1.73 M 2
GLUCOSE SERPL-MCNC: 133 MG/DL (ref 65–99)
HBA1C MFR BLD: 5.4 % (ref 4–5.6)
HCT VFR BLD AUTO: 43.2 % (ref 42–52)
HDLC SERPL-MCNC: 40 MG/DL
HGB BLD-MCNC: 14.4 G/DL (ref 14–18)
IMM GRANULOCYTES # BLD AUTO: 0.05 K/UL (ref 0–0.11)
IMM GRANULOCYTES NFR BLD AUTO: 0.5 % (ref 0–0.9)
LDLC SERPL CALC-MCNC: 83 MG/DL
LV EJECT FRACT  99904: 53
LV EJECT FRACT MOD 2C 99903: 46.6
LV EJECT FRACT MOD 4C 99902: 52.72
LV EJECT FRACT MOD BP 99901: 53.25
LYMPHOCYTES # BLD AUTO: 1.91 K/UL (ref 1–4.8)
LYMPHOCYTES NFR BLD: 17.4 % (ref 22–41)
MAGNESIUM SERPL-MCNC: 2 MG/DL (ref 1.5–2.5)
MCH RBC QN AUTO: 29.9 PG (ref 27–33)
MCHC RBC AUTO-ENTMCNC: 33.3 G/DL (ref 33.7–35.3)
MCV RBC AUTO: 89.8 FL (ref 81.4–97.8)
MONOCYTES # BLD AUTO: 1.05 K/UL (ref 0–0.85)
MONOCYTES NFR BLD AUTO: 9.6 % (ref 0–13.4)
NEUTROPHILS # BLD AUTO: 7.93 K/UL (ref 1.82–7.42)
NEUTROPHILS NFR BLD: 72 % (ref 44–72)
NRBC # BLD AUTO: 0 K/UL
NRBC BLD-RTO: 0 /100 WBC
PLATELET # BLD AUTO: 294 K/UL (ref 164–446)
PMV BLD AUTO: 11.8 FL (ref 9–12.9)
POTASSIUM SERPL-SCNC: 3.7 MMOL/L (ref 3.6–5.5)
RBC # BLD AUTO: 4.81 M/UL (ref 4.7–6.1)
SODIUM SERPL-SCNC: 142 MMOL/L (ref 135–145)
TRIGL SERPL-MCNC: 76 MG/DL (ref 0–149)
WBC # BLD AUTO: 11 K/UL (ref 4.8–10.8)

## 2022-10-07 PROCEDURE — 80048 BASIC METABOLIC PNL TOTAL CA: CPT

## 2022-10-07 PROCEDURE — 93306 TTE W/DOPPLER COMPLETE: CPT

## 2022-10-07 PROCEDURE — A9270 NON-COVERED ITEM OR SERVICE: HCPCS | Performed by: INTERNAL MEDICINE

## 2022-10-07 PROCEDURE — 700102 HCHG RX REV CODE 250 W/ 637 OVERRIDE(OP): Performed by: INTERNAL MEDICINE

## 2022-10-07 PROCEDURE — 700102 HCHG RX REV CODE 250 W/ 637 OVERRIDE(OP): Performed by: NURSE PRACTITIONER

## 2022-10-07 PROCEDURE — 80061 LIPID PANEL: CPT

## 2022-10-07 PROCEDURE — A9270 NON-COVERED ITEM OR SERVICE: HCPCS | Performed by: NURSE PRACTITIONER

## 2022-10-07 PROCEDURE — 99232 SBSQ HOSP IP/OBS MODERATE 35: CPT | Mod: FS | Performed by: STUDENT IN AN ORGANIZED HEALTH CARE EDUCATION/TRAINING PROGRAM

## 2022-10-07 PROCEDURE — 85025 COMPLETE CBC W/AUTO DIFF WBC: CPT

## 2022-10-07 PROCEDURE — 770020 HCHG ROOM/CARE - TELE (206)

## 2022-10-07 PROCEDURE — 83735 ASSAY OF MAGNESIUM: CPT

## 2022-10-07 PROCEDURE — 93010 ELECTROCARDIOGRAM REPORT: CPT | Performed by: INTERNAL MEDICINE

## 2022-10-07 PROCEDURE — 700105 HCHG RX REV CODE 258: Performed by: EMERGENCY MEDICINE

## 2022-10-07 PROCEDURE — 83036 HEMOGLOBIN GLYCOSYLATED A1C: CPT

## 2022-10-07 PROCEDURE — 93306 TTE W/DOPPLER COMPLETE: CPT | Mod: 26 | Performed by: INTERNAL MEDICINE

## 2022-10-07 RX ADMIN — SENNOSIDES AND DOCUSATE SODIUM 2 TABLET: 50; 8.6 TABLET ORAL at 05:46

## 2022-10-07 RX ADMIN — METOPROLOL TARTRATE 12.5 MG: 25 TABLET, FILM COATED ORAL at 10:56

## 2022-10-07 RX ADMIN — PRASUGREL 10 MG: 10 TABLET, FILM COATED ORAL at 05:53

## 2022-10-07 RX ADMIN — ASPIRIN 81 MG 81 MG: 81 TABLET ORAL at 05:47

## 2022-10-07 RX ADMIN — LISINOPRIL 5 MG: 5 TABLET ORAL at 05:47

## 2022-10-07 RX ADMIN — METOPROLOL TARTRATE 12.5 MG: 25 TABLET, FILM COATED ORAL at 17:32

## 2022-10-07 RX ADMIN — ATORVASTATIN CALCIUM 40 MG: 40 TABLET, FILM COATED ORAL at 17:32

## 2022-10-07 RX ADMIN — SODIUM CHLORIDE: 9 INJECTION, SOLUTION INTRAVENOUS at 01:55

## 2022-10-07 ASSESSMENT — ENCOUNTER SYMPTOMS
SHORTNESS OF BREATH: 0
WHEEZING: 0
APNEA: 0
STRIDOR: 0
CHOKING: 0
CHEST TIGHTNESS: 0
COUGH: 0

## 2022-10-07 ASSESSMENT — FIBROSIS 4 INDEX
FIB4 SCORE: 0.68
FIB4 SCORE: 0.68

## 2022-10-07 ASSESSMENT — COGNITIVE AND FUNCTIONAL STATUS - GENERAL
SUGGESTED CMS G CODE MODIFIER MOBILITY: CI
SUGGESTED CMS G CODE MODIFIER DAILY ACTIVITY: CH
MOBILITY SCORE: 23
CLIMB 3 TO 5 STEPS WITH RAILING: A LITTLE
DAILY ACTIVITIY SCORE: 24

## 2022-10-07 ASSESSMENT — PAIN DESCRIPTION - PAIN TYPE
TYPE: ACUTE PAIN
TYPE: ACUTE PAIN

## 2022-10-07 NOTE — PROGRESS NOTES
NOC Rn had pt for approximately 45 minutes before handoff to BG Venegas. Pt had 5 beats of Vtach per monitor tech. RN informed charge. Have not heard back on next action. Pt is stable with no complaints and sitting comfortably in chair.     BG Salas

## 2022-10-07 NOTE — ASSESSMENT & PLAN NOTE
Complete heart block present on admission.  Likely due to STEMI.  Repeat EKG looks improved, although pending formal read.    -pending formal EKG read

## 2022-10-07 NOTE — ASSESSMENT & PLAN NOTE
Inferior STEMI present on admission, taken to cath lab on 10/6 found to have RCA occlusion, now s/p 2 overlapping RUIZ placed to proximal mid to right RCA.  Pt reports no previous episodes of chest pain prior to this.    -cardiology following, appreciate assistance  -optimize medical management with atorvastatin, hold beta blocker in setting of complete heart block on admission, lisinopril when able, dual antiplatelet therapy  -continue monitoring for chest pain  -echocardiogram  -check a1c, lipid panel

## 2022-10-07 NOTE — PROGRESS NOTES
Assumed care of patient at bedside report from NOC RN. Updated on POC. Patient currently A & O x 4; on room air; up standby assist with no  complaints of acute pain. Assessment completed.  Call light within reach. Whiteboard updated. Fall precautions in place. Bed locked and in lowest position. All questions answered. No other needs indicated at this time.

## 2022-10-07 NOTE — PROGRESS NOTES
Cardiology Follow Up Progress Note    Date of Service  10/7/2022    Attending Physician  Mau Aly D.O.    Chief Complaint     Cardiology consultation for inferior STEMI.  HPI  Klaus Rodriguez is a 47 y.o. male with no prior cardiac history, former smoker admitted 10/6/2022 with inferior STEMI.    Interim Events  No overnight cardiac events.  Telemetry SR   underwent successful PCI to mid and proximal RCA.    Follow-up on echocardiogram  Anticipate discharge tomorrow morning.  Review of Systems  Review of Systems   Respiratory:  Negative for apnea, cough, choking, chest tightness, shortness of breath, wheezing and stridor.    Cardiovascular:  Negative for chest pain and leg swelling.     Vital signs in last 24 hours  Temp:  [36.1 °C (97 °F)-37.3 °C (99.2 °F)] 36.8 °C (98.2 °F)  Pulse:  [] 66  Resp:  [16-33] 18  BP: (115-157)/() 124/78  SpO2:  [93 %-99 %] 95 %    Physical Exam  Physical Exam  Cardiovascular:      Rate and Rhythm: Normal rate and regular rhythm.      Pulses: Normal pulses.   Skin:     General: Skin is warm.      Comments: Right radial cath site without evidence of hematoma   Neurological:      Mental Status: He is alert. Mental status is at baseline.   Psychiatric:         Mood and Affect: Mood normal.       Lab Review  Lab Results   Component Value Date/Time    WBC 11.0 (H) 10/07/2022 05:36 AM    RBC 4.81 10/07/2022 05:36 AM    HEMOGLOBIN 14.4 10/07/2022 05:36 AM    HEMATOCRIT 43.2 10/07/2022 05:36 AM    MCV 89.8 10/07/2022 05:36 AM    MCH 29.9 10/07/2022 05:36 AM    MCHC 33.3 (L) 10/07/2022 05:36 AM    MPV 11.8 10/07/2022 05:36 AM      Lab Results   Component Value Date/Time    SODIUM 143 10/06/2022 02:25 PM    POTASSIUM 3.5 (L) 10/06/2022 02:25 PM    CHLORIDE 110 10/06/2022 02:25 PM    CO2 14 (L) 10/06/2022 02:25 PM    GLUCOSE 138 (H) 10/06/2022 02:25 PM    BUN 21 10/06/2022 02:25 PM    CREATININE 1.83 (H) 10/06/2022 02:25 PM      Lab Results   Component Value Date/Time    ASTSGOT  26 10/06/2022 02:25 PM    ALTSGPT 36 10/06/2022 02:25 PM     Lab Results   Component Value Date/Time    TROPONINT 25 (H) 10/06/2022 02:25 PM       Recent Labs     10/06/22  1425   NTPROBNP 51       Cardiac Imaging and Procedures Review  EKG:  My personal interpretation of the EKG dated 10/6/2022 is inferior STEMI.    Echocardiogram:      Cardiac Catheterization:     10/6/22  IMPRESSION:  Severe obstructive one-vessel coronary artery disease with an acute thrombotic occlusion of the proximal right coronary artery.  Successful percutaneous coronary intervention to the proximal to mid right coronary artery with overlapping 4.0 x 12 mm and 4.0 x 18 mm [place] drug-eluting stents.  Preserved left ventricular systolic function.  Mildly elevated left heart filling pressures.       Imaging  Chest X-Ray:   Cardiomegaly with mild interstitial prominence.    Stress Test:  n/a    Assessment/Plan    # Inferior STEMI.  #High degree AV block, transient, resolved status post PCI to RCA.  #A1c, 5.4.  # former smoker .  # Lipid panel pending.  # Preserved LV systolic function based on LV gram.    Recommendations    - s/p PCI/RUIZ x 2 to the pRCA & mRCA, 10/6/22.  -DAPT with ASA 81 mg & Prasugrel 10 mg 6-12 months, ASA indefinitely .  -Continue atorvastatin 40 mg daily.  -Continue lisinopril 5 mg daily.  -Consider starting low-dose Metroprolol, transition to Toprol-XL at discharge.  -Follow up on echo.  -We discussed cardiac rehab.    Cardiology will follow along.  Anticipate discharge tomorrow morning.    I personally spent a total of 15 minutes which includes face-to-face time and non-face-to-face time spent on preparing to see the patient, reviewing hospital notes and tests, obtaining history from the patient, performing a medically appropriate exam, counseling and educating the patient, ordering medications/tests/procedures/referrals as clinically indicated, and documenting information in the electronic medical record.         Thank  you for allowing me to participate in the care of this patient.  I will continue to follow this patient    Please contact me with any questions.    MISTY Hunt.   Cardiologist, [facility]  [phone]

## 2022-10-07 NOTE — ASSESSMENT & PLAN NOTE
Likely pre-renal in the setting of MI.  -continue to monitor  -avoid nephrotoxins  -renally dose meds as appropriate

## 2022-10-07 NOTE — CARE PLAN
Problem: Knowledge Deficit - Standard  Goal: Patient and family/care givers will demonstrate understanding of plan of care, disease process/condition, diagnostic tests and medications  Outcome: Met     Problem: Acute Care of the Cardiac Cath Patient  Goal: Pre Procedure Optimal Outcome for the Cardiac Cath Patient  Outcome: Met  Goal: Post Procedure Optimal Outcome for the Cardiac Cath Patient  Outcome: Met   The patient is Stable - Low risk of patient condition declining or worsening    Shift Goals  Clinical Goals: maintain stable vitals and HR  Patient Goals: rest    Progress made toward(s) clinical / shift goals:  VSS    Patient is not progressing towards the following goals:

## 2022-10-07 NOTE — ASSESSMENT & PLAN NOTE
Present on admission.  Likely due to lactic acidosis in the setting of MI.    -continue to monitor, if continues consider further investigation

## 2022-10-07 NOTE — CARE PLAN
The patient is Stable - Low risk of patient condition declining or worsening    Shift Goals  Clinical Goals: maintain stable vitals and HR  Patient Goals: rest    Progress made toward(s) clinical / shift goals:    Problem: Knowledge Deficit - Standard  Goal: Patient and family/care givers will demonstrate understanding of plan of care, disease process/condition, diagnostic tests and medications  Outcome: Progressing     Pt updated on plan of care. Pt encouraged to ask questions and questions were answered. Call light within reach. Pt reminded to use call light whenever needing assistance.  Pt not complaining of any pain so far this shift, ambulating with no issues. Will continue to monitor pain and vitals and update care team with any changes.     Patient is not progressing towards the following goals:

## 2022-10-08 ENCOUNTER — PHARMACY VISIT (OUTPATIENT)
Dept: PHARMACY | Facility: MEDICAL CENTER | Age: 47
End: 2022-10-08
Payer: COMMERCIAL

## 2022-10-08 VITALS
DIASTOLIC BLOOD PRESSURE: 71 MMHG | RESPIRATION RATE: 18 BRPM | HEART RATE: 57 BPM | SYSTOLIC BLOOD PRESSURE: 114 MMHG | TEMPERATURE: 98.8 F | HEIGHT: 70 IN | BODY MASS INDEX: 37.02 KG/M2 | OXYGEN SATURATION: 98 % | WEIGHT: 258.6 LBS

## 2022-10-08 LAB
ANION GAP SERPL CALC-SCNC: 10 MMOL/L (ref 7–16)
BASOPHILS # BLD AUTO: 0.3 % (ref 0–1.8)
BASOPHILS # BLD: 0.03 K/UL (ref 0–0.12)
BUN SERPL-MCNC: 16 MG/DL (ref 8–22)
CALCIUM SERPL-MCNC: 8.8 MG/DL (ref 8.5–10.5)
CHLORIDE SERPL-SCNC: 108 MMOL/L (ref 96–112)
CO2 SERPL-SCNC: 19 MMOL/L (ref 20–33)
CREAT SERPL-MCNC: 0.76 MG/DL (ref 0.5–1.4)
EOSINOPHIL # BLD AUTO: 0.07 K/UL (ref 0–0.51)
EOSINOPHIL NFR BLD: 0.7 % (ref 0–6.9)
ERYTHROCYTE [DISTWIDTH] IN BLOOD BY AUTOMATED COUNT: 43.7 FL (ref 35.9–50)
GFR SERPLBLD CREATININE-BSD FMLA CKD-EPI: 112 ML/MIN/1.73 M 2
GLUCOSE SERPL-MCNC: 112 MG/DL (ref 65–99)
HCT VFR BLD AUTO: 41 % (ref 42–52)
HGB BLD-MCNC: 13.6 G/DL (ref 14–18)
IMM GRANULOCYTES # BLD AUTO: 0.05 K/UL (ref 0–0.11)
IMM GRANULOCYTES NFR BLD AUTO: 0.5 % (ref 0–0.9)
LYMPHOCYTES # BLD AUTO: 1.7 K/UL (ref 1–4.8)
LYMPHOCYTES NFR BLD: 17.8 % (ref 22–41)
MAGNESIUM SERPL-MCNC: 2 MG/DL (ref 1.5–2.5)
MCH RBC QN AUTO: 29.3 PG (ref 27–33)
MCHC RBC AUTO-ENTMCNC: 33.2 G/DL (ref 33.7–35.3)
MCV RBC AUTO: 88.4 FL (ref 81.4–97.8)
MONOCYTES # BLD AUTO: 0.76 K/UL (ref 0–0.85)
MONOCYTES NFR BLD AUTO: 7.9 % (ref 0–13.4)
NEUTROPHILS # BLD AUTO: 6.95 K/UL (ref 1.82–7.42)
NEUTROPHILS NFR BLD: 72.8 % (ref 44–72)
NRBC # BLD AUTO: 0 K/UL
NRBC BLD-RTO: 0 /100 WBC
PLATELET # BLD AUTO: 241 K/UL (ref 164–446)
PMV BLD AUTO: 9.8 FL (ref 9–12.9)
POTASSIUM SERPL-SCNC: 4 MMOL/L (ref 3.6–5.5)
RBC # BLD AUTO: 4.64 M/UL (ref 4.7–6.1)
SODIUM SERPL-SCNC: 137 MMOL/L (ref 135–145)
WBC # BLD AUTO: 9.6 K/UL (ref 4.8–10.8)

## 2022-10-08 PROCEDURE — 99239 HOSP IP/OBS DSCHRG MGMT >30: CPT | Performed by: HOSPITALIST

## 2022-10-08 PROCEDURE — 99232 SBSQ HOSP IP/OBS MODERATE 35: CPT | Mod: FS | Performed by: STUDENT IN AN ORGANIZED HEALTH CARE EDUCATION/TRAINING PROGRAM

## 2022-10-08 PROCEDURE — 700102 HCHG RX REV CODE 250 W/ 637 OVERRIDE(OP): Performed by: INTERNAL MEDICINE

## 2022-10-08 PROCEDURE — A9270 NON-COVERED ITEM OR SERVICE: HCPCS | Performed by: INTERNAL MEDICINE

## 2022-10-08 PROCEDURE — 80048 BASIC METABOLIC PNL TOTAL CA: CPT

## 2022-10-08 PROCEDURE — RXMED WILLOW AMBULATORY MEDICATION CHARGE: Performed by: NURSE PRACTITIONER

## 2022-10-08 PROCEDURE — 83735 ASSAY OF MAGNESIUM: CPT

## 2022-10-08 PROCEDURE — A9270 NON-COVERED ITEM OR SERVICE: HCPCS | Performed by: NURSE PRACTITIONER

## 2022-10-08 PROCEDURE — 36415 COLL VENOUS BLD VENIPUNCTURE: CPT

## 2022-10-08 PROCEDURE — 85025 COMPLETE CBC W/AUTO DIFF WBC: CPT

## 2022-10-08 PROCEDURE — 700102 HCHG RX REV CODE 250 W/ 637 OVERRIDE(OP): Performed by: NURSE PRACTITIONER

## 2022-10-08 RX ORDER — LISINOPRIL 5 MG/1
5 TABLET ORAL DAILY
Qty: 30 TABLET | Refills: 11 | Status: SHIPPED | OUTPATIENT
Start: 2022-10-09

## 2022-10-08 RX ORDER — PRASUGREL 10 MG/1
10 TABLET, FILM COATED ORAL DAILY
Qty: 30 TABLET | Refills: 11 | Status: SHIPPED | OUTPATIENT
Start: 2022-10-09

## 2022-10-08 RX ORDER — METOPROLOL SUCCINATE 25 MG/1
25 TABLET, EXTENDED RELEASE ORAL
Status: DISCONTINUED | OUTPATIENT
Start: 2022-10-08 | End: 2022-10-08 | Stop reason: HOSPADM

## 2022-10-08 RX ORDER — ATORVASTATIN CALCIUM 40 MG/1
40 TABLET, FILM COATED ORAL EVERY EVENING
Qty: 30 TABLET | Refills: 11 | Status: SHIPPED | OUTPATIENT
Start: 2022-10-08

## 2022-10-08 RX ORDER — METOPROLOL SUCCINATE 25 MG/1
25 TABLET, EXTENDED RELEASE ORAL DAILY
Qty: 30 TABLET | Refills: 11 | Status: SHIPPED | OUTPATIENT
Start: 2022-10-08

## 2022-10-08 RX ORDER — ASPIRIN 81 MG/1
81 TABLET, CHEWABLE ORAL DAILY
Qty: 100 TABLET | Refills: 11 | Status: SHIPPED | OUTPATIENT
Start: 2022-10-09

## 2022-10-08 RX ADMIN — ASPIRIN 81 MG 81 MG: 81 TABLET ORAL at 04:10

## 2022-10-08 RX ADMIN — METOPROLOL TARTRATE 12.5 MG: 25 TABLET, FILM COATED ORAL at 04:10

## 2022-10-08 RX ADMIN — LISINOPRIL 5 MG: 5 TABLET ORAL at 04:10

## 2022-10-08 RX ADMIN — PRASUGREL 10 MG: 10 TABLET, FILM COATED ORAL at 04:10

## 2022-10-08 ASSESSMENT — ENCOUNTER SYMPTOMS
COUGH: 0
CHOKING: 0
APNEA: 0
CHEST TIGHTNESS: 0
STRIDOR: 0
WHEEZING: 0
SHORTNESS OF BREATH: 0

## 2022-10-08 ASSESSMENT — PAIN DESCRIPTION - PAIN TYPE: TYPE: ACUTE PAIN

## 2022-10-08 NOTE — PROGRESS NOTES
Beside report completed with Jenny PEDERSON. Assumed pt care. Pt is sitting at edge of bed eating his breakfast. No current needs at this time. Bed in low and locked position. Call light within reach. Will continue to monitor.

## 2022-10-08 NOTE — DISCHARGE SUMMARY
Discharge Summary    CHIEF COMPLAINT ON ADMISSION  No chief complaint on file.      Reason for Admission  STEMI     Admission Date  10/6/2022    CODE STATUS  Full Code    HPI & HOSPITAL COURSE  This is a 47 y.o. male with no significant past medical history presenting with chest pain.  He was found to have inferior STEMI and taken emergently lead to Cath Lab.  He was found to have severe occlusion of proximal right coronary artery and underwent PCI with 2 RUIZ.   Echocardiogram showed LVEF of 50% with hypokinesis of inferior wall.  Patient's medication regimen has been optimized.      Therefore, he is discharged in good and stable condition to home with close outpatient follow-up.    The patient met 2-midnight criteria for an inpatient stay at the time of discharge.    Discharge Date  10/8/22    FOLLOW UP ITEMS POST DISCHARGE  F/u with cardiologist in [place]    DISCHARGE DIAGNOSES  Principal Problem:    STEMI involving right coronary artery (HCC) POA: Yes  Active Problems:    STEMI (ST elevation myocardial infarction) (HCC) POA: Unknown    CHB (complete heart block) (HCC) POA: Unknown    Leukocytosis POA: Unknown    High anion gap metabolic acidosis POA: Unknown    Hyperglycemia POA: Unknown    Acute kidney injury (HCC) POA: Unknown  Resolved Problems:    * No resolved hospital problems. *      FOLLOW UP  No future appointments.  [facility]  [address]  [address]  [facility] [address] [phone]  Call  Your doctor has referred you for Cardiac Rehab which is important in your recovery. Please call to make an appointment.      MEDICATIONS ON DISCHARGE     Medication List        START taking these medications        Instructions   aspirin 81 MG Chew chewable tablet  Start taking on: October 9, 2022  Commonly known as: ASA   Chew 1 Tablet every day.  Dose: 81 mg     atorvastatin 40 MG Tabs  Commonly known as: LIPITOR   Take 1 Tablet by mouth every evening.  Dose: 40 mg     lisinopril 5 MG  Tabs  Start taking on: October 9, 2022  Commonly known as: PRINIVIL   Take 1 Tablet by mouth every day.  Dose: 5 mg     metoprolol SR 25 MG Tb24  Commonly known as: TOPROL XL   Take 1 Tablet by mouth every day.  Dose: 25 mg     prasugrel 10 MG Tabs  Start taking on: October 9, 2022  Commonly known as: EFFIENT   Take 1 Tablet by mouth every day.  Dose: 10 mg              Allergies  Not on File    DIET  Orders Placed This Encounter   Procedures    Diet Order Diet: Cardiac     Standing Status:   Standing     Number of Occurrences:   1     Order Specific Question:   Diet:     Answer:   Cardiac [6]       ACTIVITY  As tolerated.  Weight bearing as tolerated    CONSULTATIONS  cardiology    PROCEDURES  C with PCI    LABORATORY  Lab Results   Component Value Date    SODIUM 137 10/08/2022    POTASSIUM 4.0 10/08/2022    CHLORIDE 108 10/08/2022    CO2 19 (L) 10/08/2022    GLUCOSE 112 (H) 10/08/2022    BUN 16 10/08/2022    CREATININE 0.76 10/08/2022        Lab Results   Component Value Date    WBC 9.6 10/08/2022    HEMOGLOBIN 13.6 (L) 10/08/2022    HEMATOCRIT 41.0 (L) 10/08/2022    PLATELETCT 241 10/08/2022        Total time of the discharge process exceeds 41 minutes.

## 2022-10-08 NOTE — CARE PLAN
The patient is Stable - Low risk of patient condition declining or worsening    Shift Goals  Clinical Goals: Discharge  Patient Goals: Discharge  Family Goals: HARRY    Progress made toward(s) clinical / shift goals:      Problem: Care Map:  Admission Optimal Outcome for the Heart Failure Patient  Goal: Admission:  Optimal Care of the heart failure patient  Outcome: Progressing     Problem: Care Map:  Day 1 Optimal Outcome for the Heart Failure Patient  Goal: Day 1:  Optimal Care of the heart failure patient  Outcome: Progressing     Problem: Care Map:  Day 2 Optimal Outcome for the Heart Failure Patient  Goal: Day 2:  Optimal Care of the heart failure patient  Outcome: Progressing     Problem: Care Map:  Day 3 Optimal Outcome for the Heart Failure Patient  Goal: Day 3:  Optimal Care of the heart failure patient  Outcome: Progressing     Problem: Care Map:  Day Before Discharge Optimal Outcome for the Heart Failure Patient  Goal: Day Before Discharge:  Optimal Care of the heart failure patient  Outcome: Progressing     Problem: Care Map:  Day of Discharge Optimal Outcome for the Heart Failure Patient  Goal: Day of Discharge:  Optimal Care of the heart failure patient  Outcome: Progressing

## 2022-10-08 NOTE — CARE PLAN
The patient is Stable - Low risk of patient condition declining or worsening    Shift Goals  Clinical Goals: monitor vitals  Patient Goals: home tomorro    Progress made toward(s) clinical / shift goals:      Problem: Care Map:  Day 2 Optimal Outcome for the Heart Failure Patient  Goal: Day 2:  Optimal Care of the heart failure patient  Outcome: Progressing         Patient is not progressing towards the following goals:

## 2022-10-08 NOTE — PROGRESS NOTES
Pt education provided on packet. All questions answered. Pt received medications and education provided on that. Both wife and sister at bedside. Ivs taken out, cardiac monitor removed. No other needs at this time. Pt had a safe exit from the unit with nursing staff and family at side.

## 2022-10-08 NOTE — PROGRESS NOTES
Monitor Summary  Rhythm: SR  Rate: 58-88  Ectopy: PVC,COUP,BIGEM,TRIGEM,PAC  0.15 / 0.06 / 0.48

## 2022-10-08 NOTE — PROGRESS NOTES
Cardiology Follow Up Progress Note    Date of Service  10/8/2022    Attending Physician  Mau Aly D.O.    Chief Complaint     Cardiology consultation for inferior STEMI.  HPI  Klaus Rodriguez is a 47 y.o. male with no prior cardiac history, former smoker admitted 10/6/2022 with inferior STEMI.    Interim Events  No overnight cardiac events.  Telemetry SR , PACs  S/p successful PCI to mid and proximal RCA, 10/6/22.  Echocardiogram noted, LVEF 50% hypokinesis of the inferior wall.  Tolerated low-dose Metroprolol, transition to Toprol-XL  Stable for discharge this morning    Review of Systems  Review of Systems   Respiratory:  Negative for apnea, cough, choking, chest tightness, shortness of breath, wheezing and stridor.    Cardiovascular:  Negative for chest pain and leg swelling.     Vital signs in last 24 hours  Temp:  [36.4 °C (97.6 °F)-37.1 °C (98.8 °F)] 37.1 °C (98.8 °F)  Pulse:  [57-75] 57  Resp:  [18] 18  BP: ()/(60-78) 114/71  SpO2:  [95 %-98 %] 98 %    Physical Exam  Physical Exam  Cardiovascular:      Rate and Rhythm: Normal rate and regular rhythm.      Pulses: Normal pulses.   Skin:     General: Skin is warm.      Comments: Right radial cath site without evidence of hematoma   Neurological:      Mental Status: He is alert. Mental status is at baseline.   Psychiatric:         Mood and Affect: Mood normal.       Lab Review  Lab Results   Component Value Date/Time    WBC 9.6 10/08/2022 02:07 AM    RBC 4.64 (L) 10/08/2022 02:07 AM    HEMOGLOBIN 13.6 (L) 10/08/2022 02:07 AM    HEMATOCRIT 41.0 (L) 10/08/2022 02:07 AM    MCV 88.4 10/08/2022 02:07 AM    MCH 29.3 10/08/2022 02:07 AM    MCHC 33.2 (L) 10/08/2022 02:07 AM    MPV 9.8 10/08/2022 02:07 AM      Lab Results   Component Value Date/Time    SODIUM 137 10/08/2022 02:07 AM    POTASSIUM 4.0 10/08/2022 02:07 AM    CHLORIDE 108 10/08/2022 02:07 AM    CO2 19 (L) 10/08/2022 02:07 AM    GLUCOSE 112 (H) 10/08/2022 02:07 AM    BUN 16 10/08/2022 02:07 AM     CREATININE 0.76 10/08/2022 02:07 AM      Lab Results   Component Value Date/Time    ASTSGOT 26 10/06/2022 02:25 PM    ALTSGPT 36 10/06/2022 02:25 PM     Lab Results   Component Value Date/Time    CHOLSTRLTOT 138 10/07/2022 10:00 AM    LDL 83 10/07/2022 10:00 AM    HDL 40 10/07/2022 10:00 AM    TRIGLYCERIDE 76 10/07/2022 10:00 AM    TROPONINT 25 (H) 10/06/2022 02:25 PM       Recent Labs     10/06/22  1425   NTPROBNP 51         Cardiac Imaging and Procedures Review  EKG:  My personal interpretation of the EKG dated 10/6/2022 is inferior STEMI.    Echocardiogram: 10/7/2022  Normal left ventricular systolic function.  The ejection fraction is measured to be 53 % by Titus's biplane.  Hypokinesis of the inferior wall.  Normal right ventricular size and systolic function.  No significant valvular abnormalities.   Cardiac Catheterization:         10/6/22  IMPRESSION:  Severe obstructive one-vessel coronary artery disease with an acute thrombotic occlusion of the proximal right coronary artery.  Successful percutaneous coronary intervention to the proximal to mid right coronary artery with overlapping 4.0 x 12 mm and 4.0 x 18 mm [place] drug-eluting stents.  Preserved left ventricular systolic function.  Mildly elevated left heart filling pressures.       Imaging  Chest X-Ray:   Cardiomegaly with mild interstitial prominence.    Stress Test:  n/a    Assessment/Plan    # Inferior STEMI.  #High degree AV block, transient, resolved status post PCI to RCA.  #A1c, 5.4.  # former smoker .  # Lipid panel pending.  # Preserved LV systolic function based on LV gram.  #LVEF 50%, hypokinesis of the inferior wall.    Recommendations    - s/p PCI/RUIZ x 2 to the pRCA & mRCA, 10/6/22.  -DAPT with ASA 81 mg & Prasugrel 10 mg 6-12 months, ASA indefinitely .  -Continue atorvastatin 40 mg daily.  -Continue lisinopril 5 mg daily.  -Continue Toprol-XL 25 mg daily  -Referral to cardiac rehab.    Stable for discharge.  Patient is to follow-up with  his primary care and subsequently referral to cardiologist in [facility].        I personally spent a total of 15 minutes which includes face-to-face time and non-face-to-face time spent on preparing to see the patient, reviewing hospital notes and tests, obtaining history from the patient, performing a medically appropriate exam, counseling and educating the patient, ordering medications/tests/procedures/referrals as clinically indicated, and documenting information in the electronic medical record.         Thank you for allowing me to participate in the care of this patient.  I will continue to follow this patient    Please contact me with any questions.    MISTY Hunt.   Cardiologist, [facility]  [phone]